# Patient Record
Sex: FEMALE | Race: BLACK OR AFRICAN AMERICAN | NOT HISPANIC OR LATINO | Employment: FULL TIME | URBAN - METROPOLITAN AREA
[De-identification: names, ages, dates, MRNs, and addresses within clinical notes are randomized per-mention and may not be internally consistent; named-entity substitution may affect disease eponyms.]

---

## 2018-04-22 ENCOUNTER — HOSPITAL ENCOUNTER (OUTPATIENT)
Facility: HOSPITAL | Age: 43
Discharge: HOME | End: 2018-04-23
Attending: EMERGENCY MEDICINE | Admitting: OTOLARYNGOLOGY
Payer: MEDICAID

## 2018-04-22 DIAGNOSIS — K12.2 UVULITIS: ICD-10-CM

## 2018-04-22 DIAGNOSIS — I10 ESSENTIAL HYPERTENSION: ICD-10-CM

## 2018-04-22 DIAGNOSIS — T78.3XXA ANGIOEDEMA: Primary | ICD-10-CM

## 2018-04-22 PROCEDURE — 96374 THER/PROPH/DIAG INJ IV PUSH: CPT

## 2018-04-22 PROCEDURE — S0028 INJECTION, FAMOTIDINE, 20 MG: HCPCS | Performed by: STUDENT IN AN ORGANIZED HEALTH CARE EDUCATION/TRAINING PROGRAM

## 2018-04-22 PROCEDURE — 25000003 PHARM REV CODE 250: Performed by: EMERGENCY MEDICINE

## 2018-04-22 PROCEDURE — 63600175 PHARM REV CODE 636 W HCPCS: Performed by: STUDENT IN AN ORGANIZED HEALTH CARE EDUCATION/TRAINING PROGRAM

## 2018-04-22 PROCEDURE — 99204 OFFICE O/P NEW MOD 45 MIN: CPT | Mod: 25,,, | Performed by: OTOLARYNGOLOGY

## 2018-04-22 PROCEDURE — 99285 EMERGENCY DEPT VISIT HI MDM: CPT | Mod: 25

## 2018-04-22 PROCEDURE — 96361 HYDRATE IV INFUSION ADD-ON: CPT

## 2018-04-22 PROCEDURE — 99283 EMERGENCY DEPT VISIT LOW MDM: CPT | Mod: ,,, | Performed by: EMERGENCY MEDICINE

## 2018-04-22 PROCEDURE — 25000003 PHARM REV CODE 250: Performed by: STUDENT IN AN ORGANIZED HEALTH CARE EDUCATION/TRAINING PROGRAM

## 2018-04-22 PROCEDURE — G0378 HOSPITAL OBSERVATION PER HR: HCPCS

## 2018-04-22 PROCEDURE — 31575 DIAGNOSTIC LARYNGOSCOPY: CPT | Mod: ,,, | Performed by: OTOLARYNGOLOGY

## 2018-04-22 RX ORDER — FAMOTIDINE 10 MG/ML
20 INJECTION INTRAVENOUS 2 TIMES DAILY
Status: DISCONTINUED | OUTPATIENT
Start: 2018-04-22 | End: 2018-04-23 | Stop reason: HOSPADM

## 2018-04-22 RX ORDER — LABETALOL HYDROCHLORIDE 5 MG/ML
10 INJECTION, SOLUTION INTRAVENOUS
Status: COMPLETED | OUTPATIENT
Start: 2018-04-22 | End: 2018-04-22

## 2018-04-22 RX ORDER — DEXAMETHASONE SODIUM PHOSPHATE 4 MG/ML
8 INJECTION, SOLUTION INTRA-ARTICULAR; INTRALESIONAL; INTRAMUSCULAR; INTRAVENOUS; SOFT TISSUE EVERY 8 HOURS
Status: DISCONTINUED | OUTPATIENT
Start: 2018-04-22 | End: 2018-04-23 | Stop reason: HOSPADM

## 2018-04-22 RX ORDER — ACETAMINOPHEN 325 MG/1
650 TABLET ORAL EVERY 8 HOURS PRN
Status: DISCONTINUED | OUTPATIENT
Start: 2018-04-22 | End: 2018-04-23 | Stop reason: HOSPADM

## 2018-04-22 RX ORDER — SODIUM CHLORIDE, SODIUM LACTATE, POTASSIUM CHLORIDE, CALCIUM CHLORIDE 600; 310; 30; 20 MG/100ML; MG/100ML; MG/100ML; MG/100ML
250 INJECTION, SOLUTION INTRAVENOUS
Status: COMPLETED | OUTPATIENT
Start: 2018-04-22 | End: 2018-04-22

## 2018-04-22 RX ORDER — DEXTROSE MONOHYDRATE, SODIUM CHLORIDE, AND POTASSIUM CHLORIDE 50; 1.49; 4.5 G/1000ML; G/1000ML; G/1000ML
INJECTION, SOLUTION INTRAVENOUS CONTINUOUS
Status: DISCONTINUED | OUTPATIENT
Start: 2018-04-22 | End: 2018-04-23 | Stop reason: HOSPADM

## 2018-04-22 RX ORDER — DIPHENHYDRAMINE HYDROCHLORIDE 50 MG/ML
50 INJECTION INTRAMUSCULAR; INTRAVENOUS EVERY 12 HOURS
Status: DISCONTINUED | OUTPATIENT
Start: 2018-04-22 | End: 2018-04-23 | Stop reason: HOSPADM

## 2018-04-22 RX ORDER — ONDANSETRON 8 MG/1
8 TABLET, ORALLY DISINTEGRATING ORAL EVERY 8 HOURS PRN
Status: DISCONTINUED | OUTPATIENT
Start: 2018-04-22 | End: 2018-04-23 | Stop reason: HOSPADM

## 2018-04-22 RX ORDER — METOPROLOL TARTRATE 25 MG/1
25 TABLET, FILM COATED ORAL 2 TIMES DAILY
Status: DISCONTINUED | OUTPATIENT
Start: 2018-04-22 | End: 2018-04-23 | Stop reason: HOSPADM

## 2018-04-22 RX ORDER — METOPROLOL TARTRATE 1 MG/ML
5 INJECTION, SOLUTION INTRAVENOUS EVERY 6 HOURS PRN
Status: DISCONTINUED | OUTPATIENT
Start: 2018-04-22 | End: 2018-04-23 | Stop reason: HOSPADM

## 2018-04-22 RX ADMIN — DEXAMETHASONE SODIUM PHOSPHATE 8 MG: 4 INJECTION, SOLUTION INTRAMUSCULAR; INTRAVENOUS at 10:04

## 2018-04-22 RX ADMIN — DEXTROSE MONOHYDRATE, SODIUM CHLORIDE, AND POTASSIUM CHLORIDE: 50; 4.5; 1.49 INJECTION, SOLUTION INTRAVENOUS at 10:04

## 2018-04-22 RX ADMIN — METOPROLOL TARTRATE 25 MG: 25 TABLET ORAL at 08:04

## 2018-04-22 RX ADMIN — SODIUM CHLORIDE, SODIUM LACTATE, POTASSIUM CHLORIDE, AND CALCIUM CHLORIDE 250 ML: 600; 310; 30; 20 INJECTION, SOLUTION INTRAVENOUS at 07:04

## 2018-04-22 RX ADMIN — DIPHENHYDRAMINE HYDROCHLORIDE 50 MG: 50 INJECTION, SOLUTION INTRAMUSCULAR; INTRAVENOUS at 10:04

## 2018-04-22 RX ADMIN — ACETAMINOPHEN 650 MG: 325 TABLET ORAL at 07:04

## 2018-04-22 RX ADMIN — FAMOTIDINE 20 MG: 10 INJECTION INTRAVENOUS at 08:04

## 2018-04-22 RX ADMIN — DEXAMETHASONE SODIUM PHOSPHATE 8 MG: 4 INJECTION, SOLUTION INTRAMUSCULAR; INTRAVENOUS at 02:04

## 2018-04-22 RX ADMIN — METOPROLOL TARTRATE 25 MG: 25 TABLET ORAL at 10:04

## 2018-04-22 RX ADMIN — LABETALOL HYDROCHLORIDE 10 MG: 5 INJECTION, SOLUTION INTRAVENOUS at 07:04

## 2018-04-22 NOTE — ED NOTES
Patient ambulated to restroom freely and independently.    Patient/family member aware that they are being admitted to hospital. Awaiting bed assignment at this time. Informed patient/family that nurse will keep updating patient status. Will continue to monitor at this time

## 2018-04-22 NOTE — ED NOTES
LOC: The patient is awake, alert and aware of environment with an appropriate affect, the patient is oriented x 3 and speaking with a hoarse voice.  APPEARANCE: Patient resting comfortably and in no acute distress, patient is clean and well groomed, patient's clothing is properly fastened.  SKIN: The skin is warm and dry, patient has normal skin turgor and moist mucus membranes, skin intact, no breakdown or brusing noted.  MUSCULOSKELETAL: Patient moving all extremities well, no obvious swelling or deformities noted.  RESPIRATORY: Airway is open and patent, respirations are spontaneous, patient has a normal effort and rate. Breath sounds are clear and equal bilaterally.  CARDIAC: Normal heart sounds. No peripheral edema.  ABDOMEN: Soft and non tender to palpation, no distention noted. Bowel sounds present.

## 2018-04-22 NOTE — ED TRIAGE NOTES
"Patient presents from [facility] for throat swelling, patient had a sore throat and tongue swelling Saturday am where she administered herself benadryl. Patient then had throat swelling where she presented at [place]. Patient was given albuterol, racemic epi, 125mg Solumedrol, 25mg Benadryl, 20mg Pepcid, and 2g of Rocephin. Patient states she "feels better" but is still having some difficulty swallowing. Patient denies pain/sob/chest pain.   "

## 2018-04-22 NOTE — ED NOTES
Bed: 01  Expected date: 4/22/18  Expected time: 6:14 AM  Means of arrival:   Comments:  Karlos Pan

## 2018-04-22 NOTE — NURSING
Pt arrive to floor via stretcher escorted by staff. No oxygen.Skin intact. Respiration even unlabored.AAO x 4. Family at bedside. Call light within reach.Bed in lowest position.

## 2018-04-22 NOTE — CONSULTS
Otolaryngology - Head and Neck Surgery  Consultation Report    Consultation From: ED Dr. Lopez    Chief Complaint: Airway swelling    History of Present Illness: 42 y.o. female presents with airway edema for 2 days, began with tongue swelling on Friday, which went down over the next day, however by Saturday evening her voice was changing and she felt that her airway was closing. She felt she was strangling when she lie flat. She hasn't been eating or drinking much over the past day because of the dysphagia associated with the swelling. Takes losartan for hypertension, has been taking for 6 mos without any previous episodes of angioedema. No neck pain, no fever, chills. No sore throat.     ROS: 10 pt review of systems negative other than mentioned in HPI above.    Past Medical History: Patient has a past medical history of Hypertension.    Past Surgical History: Patient has a past surgical history that includes Hysterectomy.    Social History: Patient reports that she has been smoking Cigarettes.  She has a 1.25 pack-year smoking history. She has never used smokeless tobacco. She reports that she drinks alcohol. She reports that she uses drugs, including Marijuana.    Family History: family history is not on file.    Medications:    dexamethasone  8 mg Intravenous Q8H    diphenhydrAMINE  50 mg Intravenous Q12H    famotidine (PF)  20 mg Intravenous BID    metoprolol tartrate  25 mg Oral BID       Allergies: Patient is allergic to codeine.    Physical Exam:  Temp:  [97.9 °F (36.6 °C)-98.1 °F (36.7 °C)] 98.1 °F (36.7 °C)  Pulse:  [] 91  Resp:  [15-22] 16  SpO2:  [97 %-100 %] 98 %  BP: (148-201)/() 148/99    Gen: Alert, oriented, NAD   Voice: Slightly strained, different from baseline, but good projection and speaking in full sentences without SOB  CV/Resp: Breathing comfortably, no stridor, stertor, retractions, or nasal flaring. RRR  Face: symmetrical. CN 2-12 in tact.  Eyes: EOM intact, PERRL  Ears:  Auricles symmetric. EAC normal. TM normal color and mobility without middle ear effusion.   Nose: no drainage or crusting, no septal deviation, IT 2+. No masses or mucosal irregularities. No paranasal sinus pressure or tenderness to palpation.   OC/OP: gingiva, tongue, and mucosa normal without masses, lesions, or irregularities. Good dentition without missing, broken, or carious teeth. Floor of mouth soft. Hard palate normal, Soft palate normal. Uvula midline. Tonsillar fossae symmetrical, without exudate. No posterior pharyngeal wall irregularities.   Neck: no adenopathy or masses. No thyromegaly. Palpable landmarks. Normal range of motion.   Psych: appropriate, no anxiety   Extremities/skin: full mobility, symmetrical. No skin irregularities or bruising.    Flexible Fiberoptic Laryngoscopy   Nasopharynx - the torus is clear. There are no lesions of the posterior wall.   Oropharynx - no lesions of the tongue base. There is no obvious fullness or asymmetry.  Hypopharynx - there are no lesions of the pyriform sinuses or postcricoid region    Larynx - water-bag edema of the epiglottis, improved when compared to CT scan taken several hours earlier. Edema extends to aryepiglottic folds, vallecula and pyriforms. No obstruction of airway. There are no lesions of the glottic larynx. Vocal fold mobility is normal with complete closure.       CBC    Recent Labs  Lab 04/22/18  0305   WBC 4.90   HGB 14.2   HCT 41.5   MCV 91        BMP    Recent Labs  Lab 04/22/18  0305   GLU 87   *   K 3.5      CO2 34*   BUN 12   CREATININE 0.80   CALCIUM 9.1     CT neck w/ contrast:  Edema of supraglottis, including arytenoid mucosa and epiglottis, as well as base of tongue/valecula     Assessment: 42 y.o. female with HTN and angioedema    Plan: Admit to floor for airway observation   Continuous pulse oximetry  Famotidine, Benadryl, and Decadron  Ok for diet  IV fluids - has been taking in little PO last 2 days

## 2018-04-22 NOTE — ED NOTES
Pt AAox3, resting in bed. No acute distress noted. Respirations even and unlabored. Placed on tele box, [id number], Rui in war room notified. No new complaints voiced. Updated on plan of care- going to room 540B. Side rails up x2. Call light within reach. Family at bedside. Will continue to monitor.

## 2018-04-23 VITALS
OXYGEN SATURATION: 95 % | BODY MASS INDEX: 37.76 KG/M2 | TEMPERATURE: 98 F | DIASTOLIC BLOOD PRESSURE: 93 MMHG | SYSTOLIC BLOOD PRESSURE: 136 MMHG | HEART RATE: 59 BPM | HEIGHT: 61 IN | WEIGHT: 200 LBS | RESPIRATION RATE: 18 BRPM

## 2018-04-23 PROCEDURE — 25000003 PHARM REV CODE 250: Performed by: STUDENT IN AN ORGANIZED HEALTH CARE EDUCATION/TRAINING PROGRAM

## 2018-04-23 PROCEDURE — S0028 INJECTION, FAMOTIDINE, 20 MG: HCPCS | Performed by: STUDENT IN AN ORGANIZED HEALTH CARE EDUCATION/TRAINING PROGRAM

## 2018-04-23 PROCEDURE — 99235 HOSP IP/OBS SAME DATE MOD 70: CPT | Mod: ,,, | Performed by: OTOLARYNGOLOGY

## 2018-04-23 PROCEDURE — G0378 HOSPITAL OBSERVATION PER HR: HCPCS

## 2018-04-23 PROCEDURE — 63600175 PHARM REV CODE 636 W HCPCS: Performed by: STUDENT IN AN ORGANIZED HEALTH CARE EDUCATION/TRAINING PROGRAM

## 2018-04-23 RX ORDER — METOPROLOL TARTRATE 25 MG/1
25 TABLET, FILM COATED ORAL 2 TIMES DAILY
Qty: 60 TABLET | Refills: 11 | Status: SHIPPED | OUTPATIENT
Start: 2018-04-23 | End: 2019-04-23

## 2018-04-23 RX ORDER — ACETAMINOPHEN 325 MG/1
650 TABLET ORAL EVERY 8 HOURS PRN
Refills: 0 | COMMUNITY
Start: 2018-04-23

## 2018-04-23 RX ADMIN — METOPROLOL TARTRATE 25 MG: 25 TABLET ORAL at 08:04

## 2018-04-23 RX ADMIN — DEXAMETHASONE SODIUM PHOSPHATE 8 MG: 4 INJECTION, SOLUTION INTRAMUSCULAR; INTRAVENOUS at 07:04

## 2018-04-23 RX ADMIN — FAMOTIDINE 20 MG: 10 INJECTION INTRAVENOUS at 08:04

## 2018-04-23 RX ADMIN — DIPHENHYDRAMINE HYDROCHLORIDE 50 MG: 50 INJECTION, SOLUTION INTRAMUSCULAR; INTRAVENOUS at 08:04

## 2018-04-23 NOTE — PROGRESS NOTES
[facility]  Otorhinolaryngology-Head & Neck Surgery  Progress Note    Subjective:     Post-Op Info:  * No surgery found *      Hospital Day: 2     Interval History: Doing well, had a good night, tolerated regular diet without dysphagia, voice is back to baseline.      Medications:  Continuous Infusions:   dextrose 5 % and 0.45 % NaCl with KCl 20 mEq 75 mL/hr at 04/22/18 1039     Scheduled Meds:   dexamethasone  8 mg Intravenous Q8H    diphenhydrAMINE  50 mg Intravenous Q12H    famotidine (PF)  20 mg Intravenous BID    metoprolol tartrate  25 mg Oral BID     PRN Meds:acetaminophen, metoprolol, ondansetron     Review of patient's allergies indicates:   Allergen Reactions    Codeine Swelling     Objective:     Vital Signs (24h Range):  Temp:  [96.5 °F (35.8 °C)-98.6 °F (37 °C)] 96.5 °F (35.8 °C)  Pulse:  [] 66  Resp:  [16-18] 17  SpO2:  [95 %-100 %] 98 %  BP: (148-201)/() 150/73        Lines/Drains/Airways     Peripheral Intravenous Line                 Peripheral IV - Single Lumen 04/23/18 0648 Left Forearm less than 1 day                Physical Exam  Sitting upright in bed  Breathing comfortably on room air without stridor or stertor  Voice strong     Significant Labs:  BMP:   Recent Labs  Lab 04/22/18  0305   GLU 87      CO2 34*   BUN 12   CREATININE 0.80   CALCIUM 9.1     CBC:   Recent Labs  Lab 04/22/18  0305   WBC 4.90   RBC 4.59   HGB 14.2   HCT 41.5      MCV 91   MCH 31.0   MCHC 34.3       Significant Diagnostics:  None     Assessment/Plan:     Angioedema    Doing well and stable for discharge  Stop ACE-I and ARB's  Will send with metoprolol BID  Has follow up appt with her primary care physician on 4/30   Counseled on signs of elevated blood pressure and the need to return to the Emergency Dept if any warning signs are present.            Ashia Suarez MD  Otorhinolaryngology-Head & Neck Surgery  [facility]

## 2018-04-23 NOTE — HPI
42 y.o. female presents with airway edema for 2 days, began with tongue swelling on Friday, which went down over the next day, however by Saturday evening her voice was changing and she felt that her airway was closing. She felt she was strangling when she lie flat. She hasn't been eating or drinking much over the past day because of the dysphagia associated with the swelling. Takes losartan for hypertension, has been taking for 6 mos without any previous episodes of angioedema. No neck pain, no fever, chills. No sore throat.

## 2018-04-23 NOTE — ASSESSMENT & PLAN NOTE
Doing well and stable for discharge  Stop ACE-I and ARB's  Will send with metoprolol BID  Has follow up appt with her primary care physician on 4/30   Counseled on signs of elevated blood pressure and the need to return to the Emergency Dept if any warning signs are present.

## 2018-04-23 NOTE — PLAN OF CARE
Patient is a 42 year old female admitted in transfer from [facility] 4/22/2018 with angioedema.  Patient is discharging home today with no needs.    Patient discharged home with family prior to attempted visit for admit assessment, information obtained from medical record.     PCP  Cira Johnson, NP  [address] [facility] [id number] [phone]    No Pharmacies Listed    Extended Emergency Contact Information  Primary Emergency Contact: Edgardo Rhodes  Address: [address] [place] [address] [id number] [place] [address]  Home Phone: [phone]  Mobile Phone: [phone]  Relation: Spouse  Secondary Emergency Contact: Lorena Schmidt   [place]  Mobile Phone: [phone]  Relation: Relative       04/23/18 1157   Discharge Assessment   Assessment Type Discharge Planning Assessment   Assessment information obtained from? Medical Record   Expected Length of Stay (days) 2   Prior to hospitilization cognitive status: Alert/Oriented   Prior to hospitalization functional status: Independent   Current cognitive status: Alert/Oriented   Current Functional Status: Independent   Lives With spouse   Patient's perception of discharge disposition home or selfcare   Equipment Currently Used at Home none   Do you have any problems affording any of your prescribed medications? No   Is the patient taking medications as prescribed? yes   Does the patient have transportation home? Yes   Transportation Available family or friend will provide   Discharge Plan A Home with family

## 2018-04-23 NOTE — PLAN OF CARE
Patient discharged home with no needs.       04/23/18 1204   Final Note   Assessment Type Final Discharge Note   Discharge Disposition Home   Right Care Referral Info   Post Acute Recommendation No Care

## 2018-04-23 NOTE — HOSPITAL COURSE
Doing well, had a good night, tolerated regular diet without dysphagia, voice is back to baseline.

## 2018-04-23 NOTE — NURSING
Pt d/c home per MD order. Pt verbalized understanding d/c order. IV removed and catheter tip intact. VSS. Written prescriptions given to patient .Patient left via wheelchair escorted by staff.

## 2018-04-23 NOTE — DISCHARGE SUMMARY
[facility]  Otorhinolaryngology-Head & Neck Surgery  Discharge Summary      Patient Name: Parveen Zafar  MRN: [id number]  Admission Date: 4/22/2018  Hospital Length of Stay: 0 days  Discharge Date and Time:  04/23/2018 7:03 AM  Attending Physician: Pedro Mcmillan MD   Discharging Provider: Ashia Suarez MD  Primary Care Provider: Cira Johnson NP    HPI:   42 y.o. female presents with airway edema for 2 days, began with tongue swelling on Friday, which went down over the next day, however by Saturday evening her voice was changing and she felt that her airway was closing. She felt she was strangling when she lie flat. She hasn't been eating or drinking much over the past day because of the dysphagia associated with the swelling. Takes losartan for hypertension, has been taking for 6 mos without any previous episodes of angioedema. No neck pain, no fever, chills. No sore throat.     * No surgery found *      Indwelling Lines/Drains at time of discharge:   Lines/Drains/Airways          No matching active lines, drains, or airways        Hospital Course: Doing well, had a good night, tolerated regular diet without dysphagia, voice is back to baseline.    Consults:   Consults         Status Ordering Provider     Inpatient consult to ENT  Once     Provider:  (Not yet assigned)    ADALBERTO Thomson          Significant Diagnostic Studies: CT neck with edema of supraglottis at time of admission    Pending Diagnostic Studies:     None        Final Active Diagnoses:    Diagnosis Date Noted POA    PRINCIPAL PROBLEM:  Angioedema [T78.3XXA] 04/22/2018 Yes      Problems Resolved During this Admission:    Diagnosis Date Noted Date Resolved POA      Discharged Condition: good    Disposition: Home or Self Care    Follow Up:  Follow-up Information     Cira Johnson NP. Go in 1 week.    Specialty:  Family Medicine  Why:  Hospital admission follow up (Angioedema)  Contact  information:  [address] [facility] [address]  [phone]                 Patient Instructions:     Diet Adult Regular     Activity as tolerated     Notify your health care provider if you experience any of the following:  difficulty breathing or increased cough     Notify your health care provider if you experience any of the following:  severe persistent headache     Notify your health care provider if you experience any of the following:  persistent dizziness, light-headedness, or visual disturbances     Notify your health care provider if you experience any of the following:  increased confusion or weakness       Medications:  Reconciled Home Medications:      Medication List      START taking these medications    acetaminophen 325 MG tablet  Commonly known as:  TYLENOL  Take 2 tablets (650 mg total) by mouth every 8 (eight) hours as needed.     metoprolol tartrate 25 MG tablet  Commonly known as:  LOPRESSOR  Take 1 tablet (25 mg total) by mouth 2 (two) times daily.        CONTINUE taking these medications    amLODIPine 10 MG tablet  Commonly known as:  NORVASC  Take 1 tablet (10 mg total) by mouth once daily.        STOP taking these medications    losartan-hydrochlorothiazide 50-12.5 mg 50-12.5 mg per tablet  Commonly known as:  HYZAAR          Time spent on the discharge of patient: 15 minutes    Ashia Suarez MD  Otorhinolaryngology-Head & Neck Surgery  [facility]

## 2018-04-23 NOTE — SUBJECTIVE & OBJECTIVE
Interval History: Doing well, had a good night, tolerated regular diet without dysphagia, voice is back to baseline.      Medications:  Continuous Infusions:   dextrose 5 % and 0.45 % NaCl with KCl 20 mEq 75 mL/hr at 04/22/18 1039     Scheduled Meds:   dexamethasone  8 mg Intravenous Q8H    diphenhydrAMINE  50 mg Intravenous Q12H    famotidine (PF)  20 mg Intravenous BID    metoprolol tartrate  25 mg Oral BID     PRN Meds:acetaminophen, metoprolol, ondansetron     Review of patient's allergies indicates:   Allergen Reactions    Codeine Swelling     Objective:     Vital Signs (24h Range):  Temp:  [96.5 °F (35.8 °C)-98.6 °F (37 °C)] 96.5 °F (35.8 °C)  Pulse:  [] 66  Resp:  [16-18] 17  SpO2:  [95 %-100 %] 98 %  BP: (148-201)/() 150/73        Lines/Drains/Airways     Peripheral Intravenous Line                 Peripheral IV - Single Lumen 04/23/18 0648 Left Forearm less than 1 day                Physical Exam  Sitting upright in bed  Breathing comfortably on room air without stridor or stertor  Voice strong     Significant Labs:  BMP:   Recent Labs  Lab 04/22/18  0305   GLU 87      CO2 34*   BUN 12   CREATININE 0.80   CALCIUM 9.1     CBC:   Recent Labs  Lab 04/22/18  0305   WBC 4.90   RBC 4.59   HGB 14.2   HCT 41.5      MCV 91   MCH 31.0   MCHC 34.3       Significant Diagnostics:  None

## 2019-02-03 ENCOUNTER — OFFICE VISIT (OUTPATIENT)
Dept: URGENT CARE | Facility: CLINIC | Age: 44
End: 2019-02-03
Payer: MEDICAID

## 2019-02-03 VITALS
DIASTOLIC BLOOD PRESSURE: 92 MMHG | WEIGHT: 200 LBS | HEART RATE: 93 BPM | HEIGHT: 61 IN | SYSTOLIC BLOOD PRESSURE: 148 MMHG | RESPIRATION RATE: 18 BRPM | BODY MASS INDEX: 37.76 KG/M2 | TEMPERATURE: 98 F | OXYGEN SATURATION: 98 %

## 2019-02-03 DIAGNOSIS — J32.0 MAXILLARY SINUSITIS, UNSPECIFIED CHRONICITY: Primary | ICD-10-CM

## 2019-02-03 PROCEDURE — 99204 PR OFFICE/OUTPT VISIT, NEW, LEVL IV, 45-59 MIN: ICD-10-PCS | Mod: S$GLB,,, | Performed by: PHYSICIAN ASSISTANT

## 2019-02-03 PROCEDURE — 99204 OFFICE O/P NEW MOD 45 MIN: CPT | Mod: S$GLB,,, | Performed by: PHYSICIAN ASSISTANT

## 2019-02-03 RX ORDER — DEXAMETHASONE SODIUM PHOSPHATE 100 MG/10ML
5 INJECTION INTRAMUSCULAR; INTRAVENOUS
Status: COMPLETED | OUTPATIENT
Start: 2019-02-03 | End: 2019-02-03

## 2019-02-03 RX ORDER — AMLODIPINE BESYLATE 10 MG/1
10 TABLET ORAL DAILY
COMMUNITY

## 2019-02-03 RX ORDER — PREDNISONE 10 MG/1
10 TABLET ORAL DAILY
Qty: 4 TABLET | Refills: 0 | Status: SHIPPED | OUTPATIENT
Start: 2019-02-03 | End: 2019-02-07

## 2019-02-03 RX ORDER — AMOXICILLIN AND CLAVULANATE POTASSIUM 875; 125 MG/1; MG/1
1 TABLET, FILM COATED ORAL 2 TIMES DAILY
Qty: 20 TABLET | Refills: 0 | Status: SHIPPED | OUTPATIENT
Start: 2019-02-03 | End: 2019-02-13

## 2019-02-03 RX ADMIN — DEXAMETHASONE SODIUM PHOSPHATE 5 MG: 100 INJECTION INTRAMUSCULAR; INTRAVENOUS at 10:02

## 2019-02-03 NOTE — PATIENT INSTRUCTIONS
1.  Take all antibiotics as prescribed.  It is imperative that once you start them, you take them to completion.     2.  For patients above 6 months of age who are not allergic to and are not on anticoagulants, you can alternate Tylenol and Motrin every 4-6 hours for fever above 100.4F and/or pain.  For patients less than 6 months of age, allergic to or intolerant to NSAIDS, have gastritis, gastric ulcers, or history of GI bleeds, are pregnant, or are on anticoagulant therapy, you can take Tylenol every 4 hours as needed for fever above 100.4F and/or pain.     3.  Rest and keep yourself well hydrated.  Drink hot liquids (coffee, water, tea, hot chocolate, or soup) 10-12 times a day for 5-7 days.  Put liquid in a mug and place in microwave for 2.5 - 3 minutes. Pour hot liquid into another mug not used to microwave the liquid (to avoid burning your mouth) then sniff the steam from the cup and sip the heated liquid.    4.  You can use these over the counter medications/remedies to help with your symptoms:     Runny Nose:  Use an antihistamine such as Claritin, Zyrtec or Allegra to help dry you out.     Congestion:  Use pseudoephedrine (behind the counter) for congestion- Pseudoephedrine 30 mg up to 240 mg /day. Warning:  It can raise your blood pressure and give you palpitations.  Coricidin HBP is okay to use if you have high blood pressure.     Use mucinex (guaifenisin) up to 2400mg/day to break up/loosen any mucous. MucinexDM has a cough suppressant that can be used for cough and at night to stop the tickle in the back of your throat.    Use Nasal Saline to mechanically move any post nasal drip from your eustachian tubes or from the back of your throat.    Use Afrin in each nare, for no longer than 3 days, as it is addictive. It can also dry out your mucous membranes and cause elevated blood pressure.    Use Flonase 1-2 sprays/nostril per day. It is a local acting steroid nasal spray.  If you develop a bloody nose,  stop using the medication immediately.    Sore throat:  Use warm, salt water gargles to ease your throat pain- 1/2 tsp salt to 1 cup warm water, gargle as desired.  Chloraseptic sprays and throat lozenges will also help to ease throat pain.     Sometimes Nyquil at night is beneficial to help you get some rest; however, it is sedating and does contain an antihistamine and Tylenol.  Make sure not to double up on these medications.      These things will help you to feel better and will speed your recovery.  If your condition fails to improve in a timely manner, you should receive another evaluation by your Primary Care Provider/Pediatrician to discuss your concerns or return to urgent care for a recheck.  If your condition worsens at any time, you should report immediately to your nearest Emergency Department for further evaluation. **You must understand that you have received Urgent Care treatment only and that you may be released before all of your medical problems are known or treated. You, the patient, are responsible to arrange for follow-up care as instructed.         Sinusitis (Antibiotic Treatment)    The sinuses are air-filled spaces within the bones of the face. They connect to the inside of the nose. Sinusitis is an inflammation of the tissue lining the sinus cavity. Sinus inflammation can occur during a cold. It can also be due to allergies to pollens and other particles in the air. Sinusitis can cause symptoms of sinus congestion and fullness. A sinus infection causes fever, headache and facial pain. There is often green or yellow drainage from the nose or into the back of the throat (post-nasal drip). You have been given antibiotics to treat this condition.  Home care:  · Take the full course of antibiotics as instructed. Do not stop taking them, even if you feel better.  · Drink plenty of water, hot tea, and other liquids. This may help thin mucus. It also may promote sinus drainage.  · Heat may help  soothe painful areas of the face. Use a towel soaked in hot water. Or,  the shower and direct the hot spray onto your face. Using a vaporizer along with a menthol rub at night may also help.   · An expectorant containing guaifenesin may help thin the mucus and promote drainage from the sinuses.  · Over-the-counter decongestants may be used unless a similar medicine was prescribed. Nasal sprays work the fastest. Use one that contains phenylephrine or oxymetazoline. First blow the nose gently. Then use the spray. Do not use these medicines more often than directed on the label or symptoms may get worse. You may also use tablets containing pseudoephedrine. Avoid products that combine ingredients, because side effects may be increased. Read labels. You can also ask the pharmacist for help. (NOTE: Persons with high blood pressure should not use decongestants. They can raise blood pressure.)  · Over-the-counter antihistamines may help if allergies contributed to your sinusitis.    · Do not use nasal rinses or irrigation during an acute sinus infection, unless told to by your health care provider. Rinsing may spread the infection to other sinuses.  · Use acetaminophen or ibuprofen to control pain, unless another pain medicine was prescribed. (If you have chronic liver or kidney disease or ever had a stomach ulcer, talk with your doctor before using these medicines. Aspirin should never be used in anyone under 18 years of age who is ill with a fever. It may cause severe liver damage.)  · Don't smoke. This can worsen symptoms.  Follow-up care  Follow up with your healthcare provider or our staff if you are not improving within the next week.  When to seek medical advice  Call your healthcare provider if any of these occur:  · Facial pain or headache becoming more severe  · Stiff neck  · Unusual drowsiness or confusion  · Swelling of the forehead or eyelids  · Vision problems, including blurred or double  vision  · Fever of 100.4ºF (38ºC) or higher, or as directed by your healthcare provider  · Seizure  · Breathing problems  · Symptoms not resolving within 10 days  Date Last Reviewed: 4/13/2015  © 9389-8545 [employer]. [address] [place] [address], [place] [address]. All rights reserved. This information is not intended as a substitute for professional medical care. Always follow your healthcare professional's instructions.

## 2019-02-03 NOTE — PROGRESS NOTES
"Subjective:       Patient ID: Parveen Zafar is a 43 y.o. female.    Vitals:  height is 5' 1" (1.549 m) and weight is 90.7 kg (200 lb). Her oral temperature is 97.8 °F (36.6 °C). Her blood pressure is 148/92 (abnormal) and her pulse is 93. Her respiration is 18 and oxygen saturation is 98%.     Chief Complaint: Sinus Problem    Patient complains of a RN, congestion, PND, sore throat, mild cough, headache, and sinus pressure/pain for the past 5 days.  Patient also states that her teeth and gums feel sore especially at night.  Patient denies fever and chills.  Patient also denies improvement with OTC meds.  Patient denies any other complaints at this time.         Sinus Problem   This is a new problem. Episode onset: 5 days ago. The problem has been gradually worsening since onset. There has been no fever. Her pain is at a severity of 6/10. The pain is moderate. Associated symptoms include congestion, coughing, headaches, a hoarse voice, sinus pressure, sneezing and a sore throat. Pertinent negatives include no chills, diaphoresis, ear pain, neck pain, shortness of breath or swollen glands. Treatments tried: Amoxicillin, robitussin. The treatment provided no relief.       Constitution: Negative for chills, sweating, fatigue and fever.   HENT: Positive for congestion, postnasal drip, sinus pain, sinus pressure and sore throat. Negative for ear pain and voice change.    Neck: Negative for neck pain and painful lymph nodes.   Cardiovascular: Negative for chest pain.   Eyes: Negative for eye redness.   Respiratory: Positive for cough. Negative for chest tightness, sputum production, bloody sputum, COPD, shortness of breath, stridor, wheezing and asthma.    Gastrointestinal: Negative for nausea and vomiting.   Musculoskeletal: Negative for muscle ache.   Skin: Negative for rash.   Allergic/Immunologic: Positive for sneezing. Negative for seasonal allergies and asthma.   Neurological: Positive for headaches. "   Hematologic/Lymphatic: Negative for swollen lymph nodes.       Objective:      Physical Exam   Constitutional: She is oriented to person, place, and time. She appears well-developed and well-nourished. No distress.   HENT:   Head: Normocephalic and atraumatic.   Right Ear: Tympanic membrane, external ear and ear canal normal.   Left Ear: Tympanic membrane, external ear and ear canal normal.   Nose: Mucosal edema and rhinorrhea present. Right sinus exhibits maxillary sinus tenderness. Right sinus exhibits no frontal sinus tenderness. Left sinus exhibits maxillary sinus tenderness. Left sinus exhibits no frontal sinus tenderness.   Mouth/Throat: Uvula is midline and mucous membranes are normal. Posterior oropharyngeal erythema present. No tonsillar exudate.   +PND   Eyes: Conjunctivae, EOM and lids are normal. Pupils are equal, round, and reactive to light.   Neck: Normal range of motion. Neck supple.   Cardiovascular: Normal rate, regular rhythm and normal heart sounds.   Pulmonary/Chest: Effort normal and breath sounds normal. No respiratory distress.   Abdominal: Soft. Normal appearance and bowel sounds are normal. She exhibits no distension and no mass. There is no tenderness.   Musculoskeletal: Normal range of motion.   Neurological: She is alert and oriented to person, place, and time. She has normal strength. No cranial nerve deficit or sensory deficit.   Skin: Skin is warm. Capillary refill takes less than 2 seconds.   Psychiatric: She has a normal mood and affect. Her speech is normal and behavior is normal. Judgment and thought content normal. Cognition and memory are normal.   Nursing note and vitals reviewed.      Assessment:       1. Maxillary sinusitis, unspecified chronicity        Plan:         Maxillary sinusitis, unspecified chronicity  -     dexamethasone injection 5 mg  -     predniSONE (DELTASONE) 10 MG tablet; Take 1 tablet (10 mg total) by mouth once daily. Start tomorrow. for 4 days   Dispense: 4 tablet; Refill: 0  -     amoxicillin-clavulanate 875-125mg (AUGMENTIN) 875-125 mg per tablet; Take 1 tablet by mouth 2 (two) times daily. for 10 days  Dispense: 20 tablet; Refill: 0      Patient Instructions   1.  Take all antibiotics as prescribed.  It is imperative that once you start them, you take them to completion.     2.  For patients above 6 months of age who are not allergic to and are not on anticoagulants, you can alternate Tylenol and Motrin every 4-6 hours for fever above 100.4F and/or pain.  For patients less than 6 months of age, allergic to or intolerant to NSAIDS, have gastritis, gastric ulcers, or history of GI bleeds, are pregnant, or are on anticoagulant therapy, you can take Tylenol every 4 hours as needed for fever above 100.4F and/or pain.     3.  Rest and keep yourself well hydrated.  Drink hot liquids (coffee, water, tea, hot chocolate, or soup) 10-12 times a day for 5-7 days.  Put liquid in a mug and place in microwave for 2.5 - 3 minutes. Pour hot liquid into another mug not used to microwave the liquid (to avoid burning your mouth) then sniff the steam from the cup and sip the heated liquid.    4.  You can use these over the counter medications/remedies to help with your symptoms:     Runny Nose:  Use an antihistamine such as Claritin, Zyrtec or Allegra to help dry you out.     Congestion:  Use pseudoephedrine (behind the counter) for congestion- Pseudoephedrine 30 mg up to 240 mg /day. Warning:  It can raise your blood pressure and give you palpitations.  Coricidin HBP is okay to use if you have high blood pressure.     Use mucinex (guaifenisin) up to 2400mg/day to break up/loosen any mucous. MucinexDM has a cough suppressant that can be used for cough and at night to stop the tickle in the back of your throat.    Use Nasal Saline to mechanically move any post nasal drip from your eustachian tubes or from the back of your throat.    Use Afrin in each nare, for no longer than  3 days, as it is addictive. It can also dry out your mucous membranes and cause elevated blood pressure.    Use Flonase 1-2 sprays/nostril per day. It is a local acting steroid nasal spray.  If you develop a bloody nose, stop using the medication immediately.    Sore throat:  Use warm, salt water gargles to ease your throat pain- 1/2 tsp salt to 1 cup warm water, gargle as desired.  Chloraseptic sprays and throat lozenges will also help to ease throat pain.     Sometimes Nyquil at night is beneficial to help you get some rest; however, it is sedating and does contain an antihistamine and Tylenol.  Make sure not to double up on these medications.      These things will help you to feel better and will speed your recovery.  If your condition fails to improve in a timely manner, you should receive another evaluation by your Primary Care Provider/Pediatrician to discuss your concerns or return to urgent care for a recheck.  If your condition worsens at any time, you should report immediately to your nearest Emergency Department for further evaluation. **You must understand that you have received Urgent Care treatment only and that you may be released before all of your medical problems are known or treated. You, the patient, are responsible to arrange for follow-up care as instructed.         Sinusitis (Antibiotic Treatment)    The sinuses are air-filled spaces within the bones of the face. They connect to the inside of the nose. Sinusitis is an inflammation of the tissue lining the sinus cavity. Sinus inflammation can occur during a cold. It can also be due to allergies to pollens and other particles in the air. Sinusitis can cause symptoms of sinus congestion and fullness. A sinus infection causes fever, headache and facial pain. There is often green or yellow drainage from the nose or into the back of the throat (post-nasal drip). You have been given antibiotics to treat this condition.  Home care:  · Take the full  course of antibiotics as instructed. Do not stop taking them, even if you feel better.  · Drink plenty of water, hot tea, and other liquids. This may help thin mucus. It also may promote sinus drainage.  · Heat may help soothe painful areas of the face. Use a towel soaked in hot water. Or,  the shower and direct the hot spray onto your face. Using a vaporizer along with a menthol rub at night may also help.   · An expectorant containing guaifenesin may help thin the mucus and promote drainage from the sinuses.  · Over-the-counter decongestants may be used unless a similar medicine was prescribed. Nasal sprays work the fastest. Use one that contains phenylephrine or oxymetazoline. First blow the nose gently. Then use the spray. Do not use these medicines more often than directed on the label or symptoms may get worse. You may also use tablets containing pseudoephedrine. Avoid products that combine ingredients, because side effects may be increased. Read labels. You can also ask the pharmacist for help. (NOTE: Persons with high blood pressure should not use decongestants. They can raise blood pressure.)  · Over-the-counter antihistamines may help if allergies contributed to your sinusitis.    · Do not use nasal rinses or irrigation during an acute sinus infection, unless told to by your health care provider. Rinsing may spread the infection to other sinuses.  · Use acetaminophen or ibuprofen to control pain, unless another pain medicine was prescribed. (If you have chronic liver or kidney disease or ever had a stomach ulcer, talk with your doctor before using these medicines. Aspirin should never be used in anyone under 18 years of age who is ill with a fever. It may cause severe liver damage.)  · Don't smoke. This can worsen symptoms.  Follow-up care  Follow up with your healthcare provider or our staff if you are not improving within the next week.  When to seek medical advice  Call your healthcare provider  if any of these occur:  · Facial pain or headache becoming more severe  · Stiff neck  · Unusual drowsiness or confusion  · Swelling of the forehead or eyelids  · Vision problems, including blurred or double vision  · Fever of 100.4ºF (38ºC) or higher, or as directed by your healthcare provider  · Seizure  · Breathing problems  · Symptoms not resolving within 10 days  Date Last Reviewed: 4/13/2015  © 8194-6801 [employer]. [address] [place] [address], [place] [id number]. All rights reserved. This information is not intended as a substitute for professional medical care. Always follow your healthcare professional's instructions.

## 2020-05-29 ENCOUNTER — OFFICE VISIT (OUTPATIENT)
Dept: URGENT CARE | Facility: CLINIC | Age: 45
End: 2020-05-29
Payer: MEDICAID

## 2020-05-29 VITALS
BODY MASS INDEX: 37.76 KG/M2 | OXYGEN SATURATION: 98 % | DIASTOLIC BLOOD PRESSURE: 115 MMHG | WEIGHT: 200 LBS | SYSTOLIC BLOOD PRESSURE: 189 MMHG | TEMPERATURE: 98 F | HEART RATE: 77 BPM | HEIGHT: 61 IN

## 2020-05-29 DIAGNOSIS — I10 ESSENTIAL HYPERTENSION: Primary | ICD-10-CM

## 2020-05-29 DIAGNOSIS — Z76.0 ENCOUNTER FOR MEDICATION REFILL: ICD-10-CM

## 2020-05-29 PROCEDURE — 99214 PR OFFICE/OUTPT VISIT, EST, LEVL IV, 30-39 MIN: ICD-10-PCS | Mod: S$GLB,,, | Performed by: NURSE PRACTITIONER

## 2020-05-29 PROCEDURE — 99214 OFFICE O/P EST MOD 30 MIN: CPT | Mod: S$GLB,,, | Performed by: NURSE PRACTITIONER

## 2020-05-29 RX ORDER — AMLODIPINE BESYLATE 10 MG/1
10 TABLET ORAL DAILY
Qty: 30 TABLET | Refills: 0 | Status: SHIPPED | OUTPATIENT
Start: 2020-05-29 | End: 2020-06-28

## 2020-05-29 RX ORDER — METOPROLOL TARTRATE 25 MG/1
25 TABLET, FILM COATED ORAL 2 TIMES DAILY
Qty: 60 TABLET | Refills: 0 | Status: SHIPPED | OUTPATIENT
Start: 2020-05-29 | End: 2021-05-29

## 2020-05-29 NOTE — PROGRESS NOTES
"Subjective:       Patient ID: Parveen Zafar is a 45 y.o. female.    Vitals:  height is 5' 1" (1.549 m) and weight is 90.7 kg (200 lb). Her tympanic temperature is 97.5 °F (36.4 °C). Her blood pressure is 189/115 (abnormal) and her pulse is 77. Her oxygen saturation is 98%.     Chief Complaint: Medication Refill    Pt states has been out of blood pressure medications x 1 week. Calling pcp office all week she states, able to finally get an appt for next week but did not want to be without bp meds any longer. States having no associated symptoms.     Medication Refill   This is a new problem. The current episode started in the past 7 days. The problem has been unchanged. Pertinent negatives include no abdominal pain, arthralgias, chest pain, chills, congestion, coughing, diaphoresis, fatigue, fever, headaches, joint swelling, myalgias, nausea, neck pain, numbness, rash, sore throat, vertigo, visual change, vomiting or weakness. Nothing aggravates the symptoms. She has tried nothing for the symptoms.       Constitution: Negative for chills, sweating, fatigue and fever.   HENT: Negative for congestion and sore throat.    Neck: Negative for neck pain, neck stiffness and painful lymph nodes.   Cardiovascular: Negative for chest pain, leg swelling and sob on exertion.   Eyes: Negative for double vision and blurred vision.   Respiratory: Negative for chest tightness, cough and shortness of breath.    Gastrointestinal: Negative for abdominal pain, nausea, vomiting and diarrhea.   Genitourinary: Negative for dysuria, frequency, urgency and history of kidney stones.   Musculoskeletal: Negative for joint pain, joint swelling, muscle cramps and muscle ache.   Skin: Negative for color change, pale, rash, erythema and bruising.   Allergic/Immunologic: Negative for seasonal allergies.   Neurological: Negative for dizziness, history of vertigo, light-headedness, passing out, speech difficulty, coordination disturbances, loss " of balance, headaches, numbness and tingling.   Hematologic/Lymphatic: Negative for swollen lymph nodes and easy bruising/bleeding. Does not bruise/bleed easily.   Psychiatric/Behavioral: Negative for nervous/anxious, sleep disturbance and depression. The patient is not nervous/anxious.        Objective:      Physical Exam   Constitutional: She is oriented to person, place, and time. She appears well-developed and well-nourished. She is cooperative.  Non-toxic appearance. She does not have a sickly appearance. She does not appear ill. No distress.   HENT:   Head: Normocephalic and atraumatic.   Right Ear: Hearing and external ear normal.   Left Ear: Hearing and external ear normal.   Nose: Nose normal. No nasal deformity. No epistaxis.   Mouth/Throat: Oropharynx is clear and moist and mucous membranes are normal.   Eyes: Conjunctivae are normal. Right eye exhibits no discharge. Left eye exhibits no discharge. No scleral icterus.   Neck: Normal range of motion. No tracheal deviation present.   Cardiovascular: Normal rate, regular rhythm, normal heart sounds and intact distal pulses.   No murmur heard.  Pulmonary/Chest: Effort normal and breath sounds normal. No stridor. No respiratory distress. She has no wheezes.   Abdominal: Soft. Bowel sounds are normal. She exhibits no distension. There is no tenderness. There is no guarding.   Musculoskeletal: Normal range of motion. She exhibits no edema or tenderness.   Neurological: She is alert and oriented to person, place, and time. She has normal strength. She displays no tremor. She exhibits normal muscle tone. Gait normal.   Skin: Skin is not diaphoretic, not pale and no rash. Capillary refill takes less than 2 seconds. erythema  Psychiatric: She has a normal mood and affect. Her behavior is normal. Judgment and thought content normal.         Assessment:       1. Essential hypertension    2. Encounter for medication refill        Plan:     alert nontoxic in no acute  distress.  Patient with blood pressure elevation today, she has been out of her blood pressure medicines for a week now.  No evidence end-organ damage.  Patient denies any symptoms.  Will give refill, advised to keep appointment next week as scheduled.  Advised on signs and symptoms to seek emergency care.  Smoking cessation highly encouraged just patient states she may be smokes 2 cigarettes a day.  Advised on dietary habits to follow with hypertension.  Verbalized understanding and agreement with treatment plan.    Essential hypertension  -     metoprolol tartrate (LOPRESSOR) 25 MG tablet; Take 1 tablet (25 mg total) by mouth 2 (two) times daily.  Dispense: 60 tablet; Refill: 0  -     amLODIPine (NORVASC) 10 MG tablet; Take 1 tablet (10 mg total) by mouth once daily.  Dispense: 30 tablet; Refill: 0    Encounter for medication refill  -     metoprolol tartrate (LOPRESSOR) 25 MG tablet; Take 1 tablet (25 mg total) by mouth 2 (two) times daily.  Dispense: 60 tablet; Refill: 0  -     amLODIPine (NORVASC) 10 MG tablet; Take 1 tablet (10 mg total) by mouth once daily.  Dispense: 30 tablet; Refill: 0         Patient Instructions     Keep appointment as scheduled with family provider for next week for routine healthcare and further medication refill.  Go to nearest emergency dept immediately any chest pain, shortness of breath, worst headache ever.    Established High Blood Pressure    High blood pressure (hypertension) is a chronic disease. Often, healthcare providers dont know what causes it. But it can be caused by certain health conditions and medicines.  If you have high blood pressure, you may not have any symptoms. If you do have symptoms, they may include headache, dizziness, changes in your vision, chest pain, and shortness of breath. But even without symptoms, high blood pressure thats not treated raises your risk for heart attack and stroke. High blood pressure is a serious health risk and shouldnt be  ignored.  A blood pressure reading is made up of two numbers: a higher number over a lower number. The top number is the systolic pressure. The bottom number is the diastolic pressure. A normal blood pressure is a systolic pressure of  less than 120 over a diastolic pressure of less than 80. You will see your blood pressure readings written together. For example, a person with a systolic pressure of 188 and a diastolic pressure of 78 will have 118/78 written in the medical record.  High blood pressure is when either the top number is 140 or higher, or the bottom number is 90 or higher. This must be the result when taking your blood pressure a number of times. The blood pressures between normal and high are called prehypertension.  Home care  If you have high blood pressure, you should do what is listed below to lower your blood pressure. If you are taking medicines for high blood pressure, these methods may reduce or end your need for medicines in the future.  Begin a weight-loss program if you are overweight.  Cut back on how much salt you get in your diet. Heres how to do this:  Dont eat foods that have a lot of salt. These include olives, pickles, smoked meats, and salted potato chips.  Dont add salt to your food at the table.  Use only small amounts of salt when cooking.  Start an exercise program. Talk with your healthcare provider about the type of exercise program that would be best for you. It doesn't have to be hard. Even brisk walking for 20 minutes 3 times a week is a good form of exercise.  Dont take medicines that stimulate the heart. This includes many over-the-counter cold and sinus decongestant pills and sprays, as well as diet pills. Check the warnings about hypertension on the label. Before buying any over-the-counter medicines or supplements, always ask the pharmacist about the product's potential interaction with your high blood pressure and your high blood pressure medicines.  Stimulants  such as amphetamine or cocaine could be deadly for someone with high blood pressure. Never take these.  Limit how much caffeine you get in your diet. Switch to caffeine-free products.  Stop smoking. If you are a long-time smoker, this can be hard. Talk to your healthcare provider about medicines and nicotine replacement options to help you. Also, enroll in a stop-smoking program to make it more likely that you will quit for good.  Learn how to handle stress. This is an important part of any program to lower blood pressure. Learn about relaxation methods like meditation, yoga, or biofeedback.  If your provider prescribed medicines, take them exactly as directed. Missing doses may cause your blood pressure get out of control.  If you miss a dose or doses, check with your healthcare provider or pharmacist about what to do.  Consider buying an automatic blood pressure machine. Ask your provider for a recommendation. You can get one of these at most pharmacies.     The American Heart Association recommends the following guidelines for home blood pressure monitoring:  Don't smoke or drink coffee for 30 minutes before taking your blood pressure.  Go to the bathroom before the test.  Relax for 5 minutes before taking the measurement.  Sit with your back supported (don't sit on a couch or soft chair); keep your feet on the floor uncrossed. Place your arm on a solid flat surface (like a table) with the upper part of the arm at heart level. Place the middle of the cuff directly above the eye of the elbow. Check the monitor's instruction manual for an illustration.  Take multiple readings. When you measure, take 2 to 3 readings one minute apart and record all of the results.  Take your blood pressure at the same time every day, or as your healthcare provider recommends.  Record the date, time, and blood pressure reading.  Take the record with you to your next medical appointment. If your blood pressure monitor has a built-in  memory, simply take the monitor with you to your next appointment.  Call your provider if you have several high readings. Don't be frightened by a single high blood pressure reading, but if you get several high readings, check in with your healthcare provider.  Note: When blood pressure reaches a systolic (top number) of 180 or higher OR diastolic (bottom number) of 110 or higher, seek emergency medical treatment.  Follow-up care  You will need to see your healthcare provider regularly. This is to check your blood pressure and to make changes to your medicines. Make a follow-up appointment as directed. Bring the record of your home blood pressure readings to the appointment.  When to seek medical advice  Call your healthcare provider right away if any of these occur:  Blood pressure reaches a systolic (upper number) of 180 or higher OR a diastolic (bottom number) of 110 or higher  Chest pain or shortness of breath  Severe headache  Throbbing or rushing sound in the ears  Nosebleed  Sudden severe pain in your belly (abdomen)  Extreme drowsiness, confusion, or fainting  Dizziness or spinning sensation (vertigo)  Weakness of an arm or leg or one side of the face  You have problems speaking or seeing   Date Last Reviewed: 12/1/2016  © 7535-9188 [employer]. [address] [place] [address], [place] [address]. All rights reserved. This information is not intended as a substitute for professional medical care. Always follow your healthcare professional's instructions.      ·   ·   · Follow up with your primary care in 2-5 days if symptoms have not improved, or you may return here.  · If you were referred to a specialist, please follow up with that specialty.  · If you were prescribed antibiotics, please take them to completion.  · If you were prescribed a narcotic or any medication with sedative effects, do not drive or operate heavy equipment or machinery while taking these medications.  · You must understand that  you have received treatment at an Urgent Care facility only, and that you may be released before all of your medical problems are known or treated. Urgent Care facilities are not equipped to handle life threatening emergencies. It is recommended that you go to an Emergency Department for further evaluation of worsening or concerning symptoms, or possibly life threatening conditions as discussed.                                        If you  smoke, please stop smoking

## 2020-05-29 NOTE — PATIENT INSTRUCTIONS
Keep appointment as scheduled with family provider for next week for routine healthcare and further medication refill.  Go to nearest emergency dept immediately any chest pain, shortness of breath, worst headache ever.    Established High Blood Pressure    High blood pressure (hypertension) is a chronic disease. Often, healthcare providers dont know what causes it. But it can be caused by certain health conditions and medicines.  If you have high blood pressure, you may not have any symptoms. If you do have symptoms, they may include headache, dizziness, changes in your vision, chest pain, and shortness of breath. But even without symptoms, high blood pressure thats not treated raises your risk for heart attack and stroke. High blood pressure is a serious health risk and shouldnt be ignored.  A blood pressure reading is made up of two numbers: a higher number over a lower number. The top number is the systolic pressure. The bottom number is the diastolic pressure. A normal blood pressure is a systolic pressure of  less than 120 over a diastolic pressure of less than 80. You will see your blood pressure readings written together. For example, a person with a systolic pressure of 188 and a diastolic pressure of 78 will have 118/78 written in the medical record.  High blood pressure is when either the top number is 140 or higher, or the bottom number is 90 or higher. This must be the result when taking your blood pressure a number of times. The blood pressures between normal and high are called prehypertension.  Home care  If you have high blood pressure, you should do what is listed below to lower your blood pressure. If you are taking medicines for high blood pressure, these methods may reduce or end your need for medicines in the future.  Begin a weight-loss program if you are overweight.  Cut back on how much salt you get in your diet. Heres how to do this:  Dont eat foods that have a lot of salt. These  include olives, pickles, smoked meats, and salted potato chips.  Dont add salt to your food at the table.  Use only small amounts of salt when cooking.  Start an exercise program. Talk with your healthcare provider about the type of exercise program that would be best for you. It doesn't have to be hard. Even brisk walking for 20 minutes 3 times a week is a good form of exercise.  Dont take medicines that stimulate the heart. This includes many over-the-counter cold and sinus decongestant pills and sprays, as well as diet pills. Check the warnings about hypertension on the label. Before buying any over-the-counter medicines or supplements, always ask the pharmacist about the product's potential interaction with your high blood pressure and your high blood pressure medicines.  Stimulants such as amphetamine or cocaine could be deadly for someone with high blood pressure. Never take these.  Limit how much caffeine you get in your diet. Switch to caffeine-free products.  Stop smoking. If you are a long-time smoker, this can be hard. Talk to your healthcare provider about medicines and nicotine replacement options to help you. Also, enroll in a stop-smoking program to make it more likely that you will quit for good.  Learn how to handle stress. This is an important part of any program to lower blood pressure. Learn about relaxation methods like meditation, yoga, or biofeedback.  If your provider prescribed medicines, take them exactly as directed. Missing doses may cause your blood pressure get out of control.  If you miss a dose or doses, check with your healthcare provider or pharmacist about what to do.  Consider buying an automatic blood pressure machine. Ask your provider for a recommendation. You can get one of these at most pharmacies.     The American Heart Association recommends the following guidelines for home blood pressure monitoring:  Don't smoke or drink coffee for 30 minutes before taking your blood  pressure.  Go to the bathroom before the test.  Relax for 5 minutes before taking the measurement.  Sit with your back supported (don't sit on a couch or soft chair); keep your feet on the floor uncrossed. Place your arm on a solid flat surface (like a table) with the upper part of the arm at heart level. Place the middle of the cuff directly above the eye of the elbow. Check the monitor's instruction manual for an illustration.  Take multiple readings. When you measure, take 2 to 3 readings one minute apart and record all of the results.  Take your blood pressure at the same time every day, or as your healthcare provider recommends.  Record the date, time, and blood pressure reading.  Take the record with you to your next medical appointment. If your blood pressure monitor has a built-in memory, simply take the monitor with you to your next appointment.  Call your provider if you have several high readings. Don't be frightened by a single high blood pressure reading, but if you get several high readings, check in with your healthcare provider.  Note: When blood pressure reaches a systolic (top number) of 180 or higher OR diastolic (bottom number) of 110 or higher, seek emergency medical treatment.  Follow-up care  You will need to see your healthcare provider regularly. This is to check your blood pressure and to make changes to your medicines. Make a follow-up appointment as directed. Bring the record of your home blood pressure readings to the appointment.  When to seek medical advice  Call your healthcare provider right away if any of these occur:  Blood pressure reaches a systolic (upper number) of 180 or higher OR a diastolic (bottom number) of 110 or higher  Chest pain or shortness of breath  Severe headache  Throbbing or rushing sound in the ears  Nosebleed  Sudden severe pain in your belly (abdomen)  Extreme drowsiness, confusion, or fainting  Dizziness or spinning sensation (vertigo)  Weakness of an arm or  leg or one side of the face  You have problems speaking or seeing   Date Last Reviewed: 12/1/2016  © 3214-2419 [employer]. [address] [place] [address], [place] [address]. All rights reserved. This information is not intended as a substitute for professional medical care. Always follow your healthcare professional's instructions.      ·   ·   · Follow up with your primary care in 2-5 days if symptoms have not improved, or you may return here.  · If you were referred to a specialist, please follow up with that specialty.  · If you were prescribed antibiotics, please take them to completion.  · If you were prescribed a narcotic or any medication with sedative effects, do not drive or operate heavy equipment or machinery while taking these medications.  · You must understand that you have received treatment at an Urgent Care facility only, and that you may be released before all of your medical problems are known or treated. Urgent Care facilities are not equipped to handle life threatening emergencies. It is recommended that you go to an Emergency Department for further evaluation of worsening or concerning symptoms, or possibly life threatening conditions as discussed.                                        If you  smoke, please stop smoking

## 2021-05-06 ENCOUNTER — PATIENT MESSAGE (OUTPATIENT)
Dept: RESEARCH | Facility: HOSPITAL | Age: 46
End: 2021-05-06

## 2023-07-31 NOTE — ED PROVIDER NOTES
"Encounter Date: 4/22/2018    SCRIBE #1 NOTE: I, Agata Lamar, am scribing for, and in the presence of,  Dr. Lopez. I have scribed the entire note.       History     Chief Complaint   Patient presents with    Transfer     sent from [facility] for throat swelling, sent for epiglottis/uvilits and possible abscess     Time seen by provider: 7:12 AM    This is a 42 y.o. female with medical conditions including HTN who presents as a transfer from [facility] for throat swelling. Pt is here for evaluation by ENT for epiglottis/uvulitis and possible abscess. Pt woke up on Saturday with a swollen tongue. Pt took benadryl which alleviated swelling. Last night around 9:30 p.m pt stated, "she felt like her throat started to swell up," and caused difficulty breathing. Pt arrives at [facility] feeling a bit better since onset. Pt endorses the ability to swallow saliva. Pt denies hx or heart problems, SOB, or difficulty swallowing at this time. Pt takes blood pressure medications regularly. Pt has a codeine allergy.         The history is provided by the patient.     Review of patient's allergies indicates:   Allergen Reactions    Codeine Swelling     Past Medical History:   Diagnosis Date    Hypertension      Past Surgical History:   Procedure Laterality Date    HYSTERECTOMY       History reviewed. No pertinent family history.  Social History   Substance Use Topics    Smoking status: Current Every Day Smoker     Packs/day: 0.25     Years: 5.00     Types: Cigarettes    Smokeless tobacco: Never Used    Alcohol use Yes      Comment: socially     Review of Systems   Constitutional: Negative for chills and fever.   HENT: Negative for drooling and trouble swallowing.         Throat swelling   Eyes: Negative for visual disturbance.   Respiratory: Negative for choking and shortness of breath.    Cardiovascular: Negative for chest pain.   Gastrointestinal: Negative for abdominal pain, nausea and vomiting. " Caller: Patient    Doctor: Ambar Devlin    Reason for call: Pt would like to cancel and reschedule her USGI with Dr Ambar Devlin.      Call back#: [phone]   Genitourinary: Negative for dysuria.   Musculoskeletal: Negative for gait problem and neck pain.   Skin: Negative for pallor and rash.   Neurological: Negative for dizziness and headaches.   Psychiatric/Behavioral: Negative for behavioral problems and confusion.       Physical Exam     Initial Vitals   BP Pulse Resp Temp SpO2   -- -- -- -- --      MAP       --         Physical Exam    Nursing note and vitals reviewed.  Constitutional: She appears well-developed.   42 year old  woman in minimal discomfort   HENT:   Head: Normocephalic and atraumatic.   Patient is tolerating oral secretions, no lingual swelling is identified.  Oropharyngeal exam reveals soft palate swelling as well as an enlarged uvula without magdalena exudate or evidence of impending airway compromise.  There is moderate submandibular fullness without significant tenderness.   Eyes: EOM are normal. Pupils are equal, round, and reactive to light.   Neck: No tracheal deviation present.   Cardiovascular:   Mild tachycardia noted with intact distal pulses   Pulmonary/Chest: Breath sounds normal. No stridor. No respiratory distress.   Abdominal: Soft. She exhibits no distension. There is no tenderness.   Moderately obese   Musculoskeletal: Normal range of motion. She exhibits no edema.   Neurological: She is alert and oriented to person, place, and time.   Skin: Skin is warm and dry.   Psychiatric: She has a normal mood and affect. Her behavior is normal. Thought content normal.         ED Course   Procedures  Labs Reviewed - No data to display          Medical Decision Making:   History:   Old Medical Records: I decided to obtain old medical records.  Other:   I have discussed this case with another health care provider.            Scribe Attestation:   Scribe #1: I performed the above scribed service and the documentation accurately describes the services I performed. I attest to the accuracy of the note.    Attending Attestation:              Attending ED Notes:   Patient has maintained a conversant mental status without associated respiratory distress or airway compromise.  She continues to tolerate her own oral secretions without difficulty.  She did exhibit moderate to severe hypertension consistent with chronic diagnosis which responded appropriately to IV labetalol therapy.  Also, fluid resuscitation has been initiated with Ringer's lactate.  The patient has been urgently evaluated by otolaryngology on call, and she will be admitted to their service in fair condition for further therapy and management.             Clinical Impression:   The primary encounter diagnosis was Uvulitis. A diagnosis of Essential hypertension was also pertinent to this visit.    Disposition:   Disposition: Admitted  Condition: Fair  ENT                        Stefan Lopez MD  04/22/18 0802

## 2024-05-09 ENCOUNTER — OFFICE VISIT (OUTPATIENT)
Dept: URGENT CARE | Facility: CLINIC | Age: 49
End: 2024-05-09
Payer: MEDICAID

## 2024-05-09 VITALS
BODY MASS INDEX: 40.4 KG/M2 | TEMPERATURE: 99 F | HEIGHT: 61 IN | RESPIRATION RATE: 17 BRPM | WEIGHT: 214 LBS | DIASTOLIC BLOOD PRESSURE: 85 MMHG | SYSTOLIC BLOOD PRESSURE: 144 MMHG | HEART RATE: 65 BPM | OXYGEN SATURATION: 96 %

## 2024-05-09 DIAGNOSIS — S29.012A MUSCLE STRAIN OF LEFT UPPER BACK, INITIAL ENCOUNTER: Primary | ICD-10-CM

## 2024-05-09 PROCEDURE — 99203 OFFICE O/P NEW LOW 30 MIN: CPT | Mod: S$GLB,,, | Performed by: NURSE PRACTITIONER

## 2024-05-09 RX ORDER — MELOXICAM 15 MG/1
15 TABLET ORAL DAILY
Qty: 14 TABLET | Refills: 0 | Status: SHIPPED | OUTPATIENT
Start: 2024-05-10 | End: 2024-05-24

## 2024-05-09 RX ORDER — TIZANIDINE 4 MG/1
4 TABLET ORAL EVERY 6 HOURS PRN
Qty: 10 TABLET | Refills: 0 | Status: SHIPPED | OUTPATIENT
Start: 2024-05-09

## 2024-05-09 RX ORDER — KETOROLAC TROMETHAMINE 30 MG/ML
30 INJECTION, SOLUTION INTRAMUSCULAR; INTRAVENOUS
Status: COMPLETED | OUTPATIENT
Start: 2024-05-09 | End: 2024-05-09

## 2024-05-09 RX ADMIN — KETOROLAC TROMETHAMINE 30 MG: 30 INJECTION, SOLUTION INTRAMUSCULAR; INTRAVENOUS at 10:05

## 2024-05-09 NOTE — PROGRESS NOTES
"Subjective:      Patient ID: Parveen Zafar is a 48 y.o. female.    Vitals:  height is 5' 1" (1.549 m) and weight is 97.1 kg (214 lb). Her oral temperature is 98.5 °F (36.9 °C). Her blood pressure is 144/85 (abnormal) and her pulse is 65. Her respiration is 17 and oxygen saturation is 96%.     Chief Complaint: Back Pain (Left upper back)    Pt states she have been experiencing flank pain since yesterday.    Back Pain  This is a new problem. The current episode started yesterday. The problem occurs constantly. The problem is unchanged. Pain location: upper left. The quality of the pain is described as burning and shooting. The pain is at a severity of 10/10. Worse during: when moving. Pertinent negatives include no abdominal pain, bladder incontinence, bowel incontinence, fever, leg pain or weakness.       Constitution: Negative for fever.   Gastrointestinal:  Negative for abdominal pain and bowel incontinence.   Genitourinary:  Negative for bladder incontinence.   Musculoskeletal:  Positive for back pain.      Objective:     Physical Exam   Constitutional: She is oriented to person, place, and time. She appears well-developed. She is cooperative.  Non-toxic appearance. No distress.   HENT:   Head: Normocephalic and atraumatic.   Ears:   Right Ear: External ear normal.   Left Ear: External ear normal.   Nose: Nose normal.   Mouth/Throat: Oropharynx is clear and moist and mucous membranes are normal. Mucous membranes are moist.   Eyes: Conjunctivae and lids are normal. No scleral icterus.   Neck: Trachea normal and phonation normal. Neck supple. No erythema present. No decreased range of motion present. pain with movement present.   Cardiovascular: Normal rate, regular rhythm, normal heart sounds and normal pulses.   Pulmonary/Chest: Effort normal and breath sounds normal.   Abdominal: Normal appearance and bowel sounds are normal. She exhibits no mass. Soft. There is no left CVA tenderness and no right CVA " tenderness.   Musculoskeletal:         General: Tenderness present. No deformity.      Cervical back: She exhibits tenderness and spasm. She exhibits no bony tenderness.        Back:    Neurological: She is alert and oriented to person, place, and time. She has normal strength and normal reflexes. No sensory deficit.   Skin: Skin is warm, dry, intact and not diaphoretic.   Psychiatric: Her speech is normal and behavior is normal. Mood, judgment and thought content normal.   Nursing note and vitals reviewed.      Assessment:     1. Muscle strain of left upper back, initial encounter        Plan:       Muscle strain of left upper back, initial encounter  -     ketorolac injection 30 mg  -     meloxicam (MOBIC) 15 MG tablet; Take 1 tablet (15 mg total) by mouth once daily. for 14 doses  Dispense: 14 tablet; Refill: 0  -     tiZANidine (ZANAFLEX) 4 MG tablet; Take 1 tablet (4 mg total) by mouth every 6 (six) hours as needed (Muscle spasms).  Dispense: 10 tablet; Refill: 0

## 2024-05-09 NOTE — PATIENT INSTRUCTIONS
BACK PAIN      Alternate heat and ice for first 48 hours then  apply heat. You may do gently stretching if tolerable.    Moist warm compresss to area several times daily.  May use a heating pad on LOW to provide heat over a towel which was dampended with warm water. DO NOT FALL ASLEEP WITH HEATING PAD ON.  Do not stay in one position to long.   Take any medication as directed.    If you were prescribed a narcotic medication or muscle relaxer, do not drive or operate heavy equipment or machinery while taking these medications.    You should schedule a follow-up appointment with your Primary Care Provider for a recheck in 2-3 days.     If your condition fails to improve in a timely manner, you should receive another evaluation by your Primary Care Provider to discuss your concerns.      If your condition worsens at any time, you should report immediately to your nearest Emergency Department for further evaluation. **You must understand that you have received Urgent Care treatment only and that you may be released before all of your medical problems are known or treated. You, the patient, are responsible to arrange for follow-up care as instructed.

## 2024-07-28 NOTE — PLAN OF CARE
Problem: Patient Care Overview  Goal: Plan of Care Review  Outcome: Ongoing (interventions implemented as appropriate)  Pt verbalized understanding plan of care. Frequent assessments done and fall precautions maintained. Pain and discomfort controlled. Will continue to monitor.          No